# Patient Record
Sex: FEMALE | Race: WHITE | NOT HISPANIC OR LATINO | Employment: OTHER | URBAN - METROPOLITAN AREA
[De-identification: names, ages, dates, MRNs, and addresses within clinical notes are randomized per-mention and may not be internally consistent; named-entity substitution may affect disease eponyms.]

---

## 2021-06-16 NOTE — PATIENT DISCUSSION
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 Discussed the r/b of further tightening of the left lower eyelid in order to help improve the lid position. Patient is moving to [place], [place] in a week. Recommend patient follow up at [facility] for surgery which is located close to where they are moving. Recommend aggressive lubrication near term w/ AT drops, gels, and ointment. Patient and her family member expressed understanding.

## 2021-06-16 NOTE — PATIENT DISCUSSION
PHOTOGRAPHS: I have reviewed the external ocular photographs of this patient which show the following: significant ectropion of the left lower eyelid.

## 2021-06-23 ENCOUNTER — NEW PATIENT COMPREHENSIVE (OUTPATIENT)
Dept: URBAN - METROPOLITAN AREA CLINIC 46 | Facility: CLINIC | Age: 56
End: 2021-06-23

## 2021-06-23 DIAGNOSIS — H52.7: ICD-10-CM

## 2021-06-23 DIAGNOSIS — Z01.00: ICD-10-CM

## 2021-06-23 PROCEDURE — 92015 DETERMINE REFRACTIVE STATE: CPT

## 2021-06-23 PROCEDURE — 92004 COMPRE OPH EXAM NEW PT 1/>: CPT

## 2021-06-23 ASSESSMENT — VISUAL ACUITY
OU_SC: J5
OS_OTHER: FL-.25
OU_CC: J1
OU_SC: 20/20
OU_CC: 20/20-2
OS_SC: J6
OD_OTHER: FL+.25
OS_CC: J1
OS_SC: 20/20
OD_SC: 20/30
OD_CC: 20/25
OD_SC: J8
OD_CC: J1
OS_CC: 20/25

## 2021-06-23 ASSESSMENT — TONOMETRY
OD_IOP_MMHG: 17
OS_IOP_MMHG: 18

## 2022-07-08 ENCOUNTER — PREPPED CHART (OUTPATIENT)
Dept: URBAN - METROPOLITAN AREA CLINIC 47 | Facility: CLINIC | Age: 57
End: 2022-07-08